# Patient Record
Sex: FEMALE | Race: WHITE | NOT HISPANIC OR LATINO | Employment: FULL TIME | URBAN - METROPOLITAN AREA
[De-identification: names, ages, dates, MRNs, and addresses within clinical notes are randomized per-mention and may not be internally consistent; named-entity substitution may affect disease eponyms.]

---

## 2023-11-13 ENCOUNTER — CONSULT (OUTPATIENT)
Dept: ALLERGY | Facility: CLINIC | Age: 53
End: 2023-11-13
Payer: COMMERCIAL

## 2023-11-13 ENCOUNTER — LAB (OUTPATIENT)
Dept: LAB | Facility: LAB | Age: 53
End: 2023-11-13
Payer: COMMERCIAL

## 2023-11-13 VITALS — HEART RATE: 80 BPM | WEIGHT: 260.2 LBS | OXYGEN SATURATION: 97 %

## 2023-11-13 DIAGNOSIS — J45.20 MILD INTERMITTENT EXTRINSIC ASTHMA WITHOUT COMPLICATION (HHS-HCC): Primary | ICD-10-CM

## 2023-11-13 DIAGNOSIS — T78.1XXA ADVERSE FOOD REACTION, INITIAL ENCOUNTER: ICD-10-CM

## 2023-11-13 DIAGNOSIS — J30.89 NON-SEASONAL ALLERGIC RHINITIS, UNSPECIFIED TRIGGER: ICD-10-CM

## 2023-11-13 DIAGNOSIS — J45.20 MILD INTERMITTENT EXTRINSIC ASTHMA WITHOUT COMPLICATION (HHS-HCC): ICD-10-CM

## 2023-11-13 PROBLEM — J45.909 EXTRINSIC ASTHMA WITHOUT COMPLICATION (HHS-HCC): Status: ACTIVE | Noted: 2023-11-13

## 2023-11-13 PROCEDURE — 86001 ALLERGEN SPECIFIC IGG: CPT

## 2023-11-13 PROCEDURE — 86003 ALLG SPEC IGE CRUDE XTRC EA: CPT

## 2023-11-13 PROCEDURE — 36415 COLL VENOUS BLD VENIPUNCTURE: CPT

## 2023-11-13 PROCEDURE — 99204 OFFICE O/P NEW MOD 45 MIN: CPT | Performed by: ALLERGY & IMMUNOLOGY

## 2023-11-13 PROCEDURE — 82785 ASSAY OF IGE: CPT

## 2023-11-13 RX ORDER — FLUTICASONE FUROATE 27.5 UG/1
2 SPRAY, METERED NASAL
Qty: 9.1 G | Refills: 11
Start: 2023-11-13 | End: 2023-12-13

## 2023-11-13 RX ORDER — ALBUTEROL SULFATE 90 UG/1
2 AEROSOL, METERED RESPIRATORY (INHALATION) EVERY 4 HOURS PRN
COMMUNITY
Start: 2019-12-18

## 2023-11-13 RX ORDER — CEPHALEXIN 250 MG/1
1 CAPSULE ORAL
COMMUNITY
Start: 2023-05-23

## 2023-11-13 RX ORDER — SUMATRIPTAN SUCCINATE 100 MG/1
100 TABLET ORAL ONCE AS NEEDED
COMMUNITY
Start: 2022-09-02

## 2023-11-13 RX ORDER — SERTRALINE HYDROCHLORIDE 25 MG/1
25 TABLET, FILM COATED ORAL
COMMUNITY
Start: 2023-11-06

## 2023-11-13 RX ORDER — MONTELUKAST SODIUM 10 MG/1
10 TABLET ORAL NIGHTLY
COMMUNITY

## 2023-11-13 RX ORDER — METOPROLOL SUCCINATE 50 MG/1
50 TABLET, EXTENDED RELEASE ORAL
COMMUNITY

## 2023-11-13 ASSESSMENT — ENCOUNTER SYMPTOMS
RHINORRHEA: 1
FACIAL SWELLING: 0
PALPITATIONS: 0
CONSTITUTIONAL NEGATIVE: 1
WHEEZING: 0
SLEEP DISTURBANCE: 1
SORE THROAT: 0
CONFUSION: 0
HEADACHES: 1
AGITATION: 0
COUGH: 0
SINUS PRESSURE: 1
SINUS PAIN: 1
ARTHRALGIAS: 1
CHEST TIGHTNESS: 0
DYSPHORIC MOOD: 0
STRIDOR: 0
SHORTNESS OF BREATH: 0

## 2023-11-13 NOTE — PROGRESS NOTES
Subjective   Patient ID:   [id number]   Eliud Michelle is a 53 y.o. female who presents for Allergy Testing, Allergies (npv), and Asthma.    Chief Complaint   Patient presents with    Allergy Testing    Allergies     npv    Asthma        Patient reports onset of allergy symptoms at age 13, which are worse spring and fall.  Since 2018, her Sx have increased and worsened.  She has sleep apnea believed to be due to edematous sinuses and being overweight.  Patient tried CPAP but could not tolerate it, sleeps in a chair and wakes with migraines.  She is C/O sinus pressure, pain, fullness and tonsillar hypertrophy.  She has intermittent ear pressure, pain and fullness, has a H/O chronic OM and had PE tubes in bilateral ears in the past.  [facility] has not been able to effectively treat or control her Sx.  She endorses spring and fall allergies.  She also notes certain foods worsen her Sx and she will have profuse rhinorrhea.  Yesterday, she had [employer] Sour [employer] bread with only 4 ingredients and she had profuse sneezing and nose blowing.  She wonder is this is due to a food or wheat allergy.  Patient was never tested but was told to follow up with an allergist.    Patient's  feels her Sx are due to the cats or their litter, but patient states they have had cats x20 years.  Patient has tried OTC Claritin, Zyrtec and Benadryl *(at night due to drowsiness).  Claritin works in the fall but not in the spring and Zyrtec works in the spring but not in the fall.  Allegra is ineffective.  She stopped using nasal sprays because they go down there throat and are ineffective.  Patient takes Advair BID.      Patient has allergy-induced asthma, which she treats with her albuterol inhaler.  It helps but does not control her Sx.  She has tried breathing treatments when her lung Sx flare.  Her asthma is controlled and she uses her albuterol inhaler about 1-2 times a week.    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    Patient is S/P hysterectomy.  Patient consumes an organic diet including several vegetables.  She was on a 1000 calorie diet x1 month and lost 2 lbs but told she \"eats too much.\"    Patient has a H/O bilateral knee and thumb arthritis.  She had carpal tunnel syndrome that resolved with therapy and chiropractic care      Patient takes Zoloft.  Patient's son will be receiving his [employer] .  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    Patient's grandmother had dementia.        Review of Systems   Constitutional: Negative.    HENT:  Positive for congestion, rhinorrhea, sinus pressure, sinus pain and sneezing. Negative for ear discharge, ear pain, facial swelling and sore throat.    Respiratory:  Negative for cough, chest tightness, shortness of breath, wheezing and stridor.    Cardiovascular:  Negative for chest pain, palpitations and leg swelling.   Musculoskeletal:  Positive for arthralgias.   Skin:  Negative for rash.   Allergic/Immunologic: Positive for environmental allergies. Negative for food allergies and immunocompromised state.   Neurological:  Positive for headaches.   Psychiatric/Behavioral:  Positive for sleep disturbance. Negative for agitation, confusion and dysphoric mood.      Objective     Pulse 80   Wt 118 kg (260 lb 3.2 oz)   SpO2 97%      Physical Exam  Constitutional:       Appearance: Normal appearance.   HENT:      Head: Normocephalic and atraumatic.      Right Ear: External ear normal. There is no impacted cerumen.      Left Ear: External ear normal. There is no impacted cerumen.      Ears:      Comments: Scars in bilateral tympanic membranes.      Nose: Congestion present. No rhinorrhea.      Comments: Bilateral nasal turbinate edema,  3 on the left and 4 on the right.     Mouth/Throat:      Comments: Narrow posterior oropharynx.  Eyes:      Extraocular Movements: Extraocular movements intact.      Conjunctiva/sclera: Conjunctivae normal.      Pupils: " Pupils are equal, round, and reactive to light.   Cardiovascular:      Rate and Rhythm: Normal rate and regular rhythm.      Heart sounds: No murmur heard.     No friction rub. No gallop.   Pulmonary:      Effort: No respiratory distress.      Breath sounds: No wheezing, rhonchi or rales.   Skin:     General: Skin is warm and dry.   Neurological:      Mental Status: She is alert.   Psychiatric:         Mood and Affect: Mood normal.         Behavior: Behavior normal.       Current Outpatient Medications   Medication Sig Dispense Refill    Advair Diskus 100-50 mcg/dose diskus inhaler Inhale 1 puff 2 times a day.      albuterol 90 mcg/actuation inhaler Inhale 2 puffs every 4 hours if needed.      sertraline (Zoloft) 25 mg tablet Take 1 tablet (25 mg) by mouth once daily.      SUMAtriptan (Imitrex) 100 mg tablet Take 1 tablet (100 mg) by mouth 1 time if needed.      fluticasone (Flonase Sensimist) 27.5 mcg/actuation nasal spray Administer 2 sprays into each nostril once daily. 9.1 g 11    metoprolol succinate XL (Toprol-XL) 50 mg 24 hr tablet 1 tablet (50 mg).      montelukast (Singulair) 10 mg tablet Take 1 tablet (10 mg) by mouth once daily at bedtime.       No current facility-administered medications for this visit.       Orders Placed This Encounter   Procedures    Respiratory Allergy Profile IgE     Standing Status:   Future     Number of Occurrences:   1     Standing Expiration Date:   11/13/2024     Order Specific Question:   Release result to MyChart     Answer:   Immediate [1]    Cow's Milk IgE     Standing Status:   Future     Number of Occurrences:   1     Standing Expiration Date:   11/13/2024     Order Specific Question:   Release result to MyChart     Answer:   Immediate [1]    Egg, White IgE     Standing Status:   Future     Number of Occurrences:   1     Standing Expiration Date:   11/13/2024     Order Specific Question:   Release result to MyChart     Answer:   Immediate [1]    Soybean IgE     Standing  Status:   Future     Number of Occurrences:   1     Standing Expiration Date:   11/13/2024     Order Specific Question:   Release result to MyChart     Answer:   Immediate [1]    Wheat IgE     Standing Status:   Future     Number of Occurrences:   1     Standing Expiration Date:   11/13/2024     Order Specific Question:   Release result to MyChart     Answer:   Immediate [1]    Soybean IgG     Standing Status:   Future     Number of Occurrences:   1     Standing Expiration Date:   11/13/2024     Order Specific Question:   Release result to MyChart     Answer:   Immediate [1]    Whey IgG     Standing Status:   Future     Number of Occurrences:   1     Standing Expiration Date:   11/13/2024     Order Specific Question:   Release result to MyChart     Answer:   Immediate [1]    Egg, White IgG     Standing Status:   Future     Number of Occurrences:   1     Standing Expiration Date:   11/13/2024     Order Specific Question:   Release result to MyChart     Answer:   Immediate [1]    Wheat IgG     Standing Status:   Future     Number of Occurrences:   1     Standing Expiration Date:   11/13/2024     Order Specific Question:   Release result to MyChart     Answer:   Immediate [1]        Assessment/Plan         Extrinsic asthma without complication  Allergy-induced symptoms.      Non-seasonal allergic rhinitis  Since 2018, worsening fall and spring allergy Sx including sinus and ear pain/pressure/fullness, congestion and rhinorrhea despite Claritin, Zyrtec and nasal sprays.  She will have a respiratory allergy profile.  Continue allergy medications as needed.    Start Flonase sensimist. 1 spray each nostril twice a day  Continue Montelukast, Zyrtec and or Claritin    Adverse food reaction  We discussed the limited benefit of IgG testing. Explained not FDA approved and not validated.      By signing my name below, Dodie FREEMAN Scribe, attest that this documentation has been prepared under the direction and in the  presence of Karen Meza MD.   All medical record entries made by the Scribe were at my direction and personally dictated by me. I have reviewed the chart and agree that the record accurately reflects my personal performance of the history, physical exam, discussion and plan.

## 2023-11-13 NOTE — ASSESSMENT & PLAN NOTE
Since 2018, worsening fall and spring allergy Sx including sinus and ear pain/pressure/fullness, congestion and rhinorrhea despite Claritin, Zyrtec and nasal sprays.  She will have a respiratory allergy profile.  Continue allergy medications as needed.    Start Flonase sensimist. 1 spray each nostril twice a day  Continue Montelukast, Zyrtec and or Claritin

## 2023-11-13 NOTE — PATIENT INSTRUCTIONS
Obtain blood work to evaluate allergies and foods.  We will call you with results, recommendations and followup plan.     Continue over the counter medications as needed.    Continue Singulair and albuterol.    Start over the counter Flonase Sensimist, one spray each site two time a day.  To increase the efficacy of your nasal spray, be sure to look down while using it.?  Spray slightly away from the direction of your nasal septum (the bone in the middle of your nose) and only sniff after you have sprayed - avoid spraying and sniffing at the same time, or else a lot of spray will go down your throat.    IgG food intolerance testing is available, but not FDA approved and considered experimental.  If you would like to have this testing done, we can order an IgG test, but I recommend you check with your insurance company first to verify coverage.  [place] code is 245[address] and ICD-10 code is R21.

## 2023-11-14 LAB
A ALTERNATA IGE QN: <0.1 KU/L
A FUMIGATUS IGE QN: <0.1 KU/L
BERMUDA GRASS IGE QN: 1.25 KU/L
BOXELDER IGE QN: <0.1 KU/L
C HERBARUM IGE QN: <0.1 KU/L
CALIF WALNUT POLN IGE QN: <0.1 KU/L
CAT DANDER IGE QN: 13.9 KU/L
CMN PIGWEED IGE QN: <0.1 KU/L
COMMON RAGWEED IGE QN: 0.39 KU/L
COTTONWOOD IGE QN: <0.1 KU/L
D FARINAE IGE QN: <0.1 KU/L
D PTERONYSS IGE QN: <0.1 KU/L
DOG DANDER IGE QN: 5.06 KU/L
EGG WHITE IGE QN: 0.16 KU/L
ENGL PLANTAIN IGE QN: <0.1 KU/L
GOOSEFOOT IGE QN: <0.1 KU/L
JOHNSON GRASS IGE QN: 1.57 KU/L
KENT BLUE GRASS IGE QN: 3.84 KU/L
LONDON PLANE IGE QN: <0.1 KU/L
MILK IGE QN: <0.1 KU/L
MT JUNIPER IGE QN: <0.1 KU/L
P NOTATUM IGE QN: <0.1 KU/L
PECAN/HICK TREE IGE QN: <0.1 KU/L
ROACH IGE QN: <0.1 KU/L
SALTWORT IGE QN: <0.1 KU/L
SHEEP SORREL IGE QN: <0.1 KU/L
SILVER BIRCH IGE QN: <0.1 KU/L
SOYBEAN IGE QN: <0.1 KU/L
TIMOTHY IGE QN: 2.72 KU/L
TOTAL IGE SMQN RAST: 131 KU/L
WHEAT IGE QN: <0.1 KU/L
WHITE ASH IGE QN: <0.1 KU/L
WHITE ELM IGE QN: <0.1 KU/L
WHITE MULBERRY IGE QN: <0.1 KU/L
WHITE OAK IGE QN: <0.1 KU/L

## 2023-11-15 LAB
COW WHEY IGG AB [MASS/VOLUME] IN SERUM: 24.3 MCG/ML
EGG WHITE IGG-MCNC: 21.1 MCG/ML
SOYBEAN IGG-MCNC: <2 MCG/ML
WHEAT IGG-MCNC: 5.76 MCG/ML